# Patient Record
Sex: FEMALE | Race: WHITE | NOT HISPANIC OR LATINO
[De-identification: names, ages, dates, MRNs, and addresses within clinical notes are randomized per-mention and may not be internally consistent; named-entity substitution may affect disease eponyms.]

---

## 2017-11-08 ENCOUNTER — APPOINTMENT (OUTPATIENT)
Dept: INTERNAL MEDICINE | Facility: CLINIC | Age: 62
End: 2017-11-08
Payer: COMMERCIAL

## 2017-11-08 PROCEDURE — 36415 COLL VENOUS BLD VENIPUNCTURE: CPT

## 2017-11-08 PROCEDURE — 93000 ELECTROCARDIOGRAM COMPLETE: CPT

## 2017-11-08 PROCEDURE — 99396 PREV VISIT EST AGE 40-64: CPT | Mod: 25

## 2018-01-24 ENCOUNTER — APPOINTMENT (OUTPATIENT)
Dept: DERMATOLOGY | Facility: CLINIC | Age: 63
End: 2018-01-24
Payer: COMMERCIAL

## 2018-01-24 PROCEDURE — 99213 OFFICE O/P EST LOW 20 MIN: CPT

## 2018-07-23 ENCOUNTER — RX RENEWAL (OUTPATIENT)
Age: 63
End: 2018-07-23

## 2018-07-24 ENCOUNTER — RX RENEWAL (OUTPATIENT)
Age: 63
End: 2018-07-24

## 2018-12-19 ENCOUNTER — NON-APPOINTMENT (OUTPATIENT)
Age: 63
End: 2018-12-19

## 2018-12-19 ENCOUNTER — APPOINTMENT (OUTPATIENT)
Dept: INTERNAL MEDICINE | Facility: CLINIC | Age: 63
End: 2018-12-19
Payer: COMMERCIAL

## 2018-12-19 VITALS
SYSTOLIC BLOOD PRESSURE: 113 MMHG | WEIGHT: 170 LBS | DIASTOLIC BLOOD PRESSURE: 78 MMHG | HEIGHT: 70 IN | BODY MASS INDEX: 24.34 KG/M2

## 2018-12-19 DIAGNOSIS — Z00.00 ENCOUNTER FOR GENERAL ADULT MEDICAL EXAMINATION W/OUT ABNORMAL FINDINGS: ICD-10-CM

## 2018-12-19 PROCEDURE — 36415 COLL VENOUS BLD VENIPUNCTURE: CPT

## 2018-12-19 PROCEDURE — 99396 PREV VISIT EST AGE 40-64: CPT | Mod: 25

## 2018-12-19 PROCEDURE — 93000 ELECTROCARDIOGRAM COMPLETE: CPT

## 2018-12-19 NOTE — HISTORY OF PRESENT ILLNESS
[FreeTextEntry1] : PHYSICAL [de-identified] : 63-year-old female retired  comes in for annual exam overall feeling well

## 2018-12-19 NOTE — PLAN
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

## 2019-01-17 LAB — 25(OH)D3 SERPL-MCNC: 31.8 NG/ML

## 2019-01-25 ENCOUNTER — APPOINTMENT (OUTPATIENT)
Dept: DERMATOLOGY | Facility: CLINIC | Age: 64
End: 2019-01-25
Payer: COMMERCIAL

## 2019-01-25 PROCEDURE — 99214 OFFICE O/P EST MOD 30 MIN: CPT

## 2019-01-31 ENCOUNTER — RX RENEWAL (OUTPATIENT)
Age: 64
End: 2019-01-31

## 2019-04-04 ENCOUNTER — APPOINTMENT (OUTPATIENT)
Dept: INTERNAL MEDICINE | Facility: CLINIC | Age: 64
End: 2019-04-04
Payer: COMMERCIAL

## 2019-04-04 VITALS
BODY MASS INDEX: 24.34 KG/M2 | WEIGHT: 170 LBS | HEIGHT: 70 IN | SYSTOLIC BLOOD PRESSURE: 110 MMHG | DIASTOLIC BLOOD PRESSURE: 60 MMHG

## 2019-04-04 DIAGNOSIS — N63.0 UNSPECIFIED LUMP IN UNSPECIFIED BREAST: ICD-10-CM

## 2019-04-04 PROCEDURE — 99358 PROLONG SERVICE W/O CONTACT: CPT

## 2019-04-04 PROCEDURE — 99214 OFFICE O/P EST MOD 30 MIN: CPT | Mod: 25

## 2019-04-04 NOTE — ASSESSMENT
[Patient Optimized for Surgery] : Patient optimized for surgery [No Further Testing Recommended] : no further testing recommended [As per surgery] : as per surgery [FreeTextEntry4] : Ms. WARREN MARIN is a 64 year female comes to the office for preoperative evaluation for left breast lumpectomy. Patient feels well and has no complaints at this time.  Patient has greater than 4 METS, is a low perioperative risk for Major adverse cardiac event. ECG and blood work reviewed. No further testing indicated at this time. Patient is medically optimized for this procedure.

## 2019-04-04 NOTE — HISTORY OF PRESENT ILLNESS
[No Pertinent Cardiac History] : no history of aortic stenosis, atrial fibrillation, coronary artery disease, recent myocardial infarction, or implantable device/pacemaker [No Pertinent Pulmonary History] : no history of asthma, COPD, sleep apnea, or smoking [No Adverse Anesthesia Reaction] : no adverse anesthesia reaction in self or family member [Chronic Anticoagulation] : no chronic anticoagulation [Chronic Kidney Disease] : no chronic kidney disease [Diabetes] : no diabetes [(Patient denies any chest pain, claudication, dyspnea on exertion, orthopnea, palpitations or syncope)] : Patient denies any chest pain, claudication, dyspnea on exertion, orthopnea, palpitations or syncope [Good (7-10 METs)] : Good (7-10 METs) [FreeTextEntry1] : left breast lumpectomy  [FreeTextEntry2] : 04/08/19 [FreeTextEntry3] : Dr. Dueñas [FreeTextEntry4] : Ms. WARREN MARIN is a 64 year female comes to the office for preoperative evaluation for left breast lumpectomy. Patient feels well and has no complaints at this time.

## 2019-04-04 NOTE — PLAN
[FreeTextEntry1] : Ms. WARREN MARIN is a 64 year female comes to the office for preoperative evaluation for left breast lumpectomy. Patient feels well and has no complaints at this time.  Patient has greater than 4 METS, is a low perioperative risk for Major adverse cardiac event. ECG and blood work reviewed. No further testing indicated at this time. Patient is medically optimized for this procedure. \par \par Prior to patient's arrival, extensive medical records were reviewed including but not limited to, Hospital records records, out patient records, laboratory data and microbiology data \par In addition extensive time was also spent in reviewing diagnostic studies.\par \par The duration of the review took 35 minutes \par \par Counseling included abnormal lab results, differential diagnoses, treatment options, risks and benefits, lifestyle changes, prognosis, current condition, medications, and dose adjustments. \par The patient was interactive, attentive, asked questions, and verbalized understanding

## 2019-04-28 ENCOUNTER — RX RENEWAL (OUTPATIENT)
Age: 64
End: 2019-04-28

## 2019-04-28 DIAGNOSIS — Z91.09 OTHER ALLERGY STATUS, OTHER THAN TO DRUGS AND BIOLOGICAL SUBSTANCES: ICD-10-CM

## 2019-12-13 PROBLEM — Z91.09 ENVIRONMENTAL ALLERGIES: Status: ACTIVE | Noted: 2018-07-24

## 2020-01-20 ENCOUNTER — APPOINTMENT (OUTPATIENT)
Dept: DERMATOLOGY | Facility: CLINIC | Age: 65
End: 2020-01-20
Payer: COMMERCIAL

## 2020-01-20 PROCEDURE — 99213 OFFICE O/P EST LOW 20 MIN: CPT

## 2020-04-19 ENCOUNTER — RX RENEWAL (OUTPATIENT)
Age: 65
End: 2020-04-19

## 2020-07-07 ENCOUNTER — NON-APPOINTMENT (OUTPATIENT)
Age: 65
End: 2020-07-07

## 2020-07-07 ENCOUNTER — APPOINTMENT (OUTPATIENT)
Dept: INTERNAL MEDICINE | Facility: CLINIC | Age: 65
End: 2020-07-07
Payer: COMMERCIAL

## 2020-07-07 VITALS
SYSTOLIC BLOOD PRESSURE: 112 MMHG | HEIGHT: 70 IN | OXYGEN SATURATION: 98 % | DIASTOLIC BLOOD PRESSURE: 72 MMHG | WEIGHT: 172.25 LBS | TEMPERATURE: 98.3 F | HEART RATE: 60 BPM | BODY MASS INDEX: 24.66 KG/M2

## 2020-07-07 VITALS
HEART RATE: 60 BPM | HEIGHT: 70 IN | OXYGEN SATURATION: 98 % | WEIGHT: 172 LBS | TEMPERATURE: 98.3 F | SYSTOLIC BLOOD PRESSURE: 112 MMHG | BODY MASS INDEX: 24.62 KG/M2 | DIASTOLIC BLOOD PRESSURE: 72 MMHG

## 2020-07-07 DIAGNOSIS — Z12.4 ENCOUNTER FOR SCREENING FOR MALIGNANT NEOPLASM OF CERVIX: ICD-10-CM

## 2020-07-07 PROCEDURE — 93000 ELECTROCARDIOGRAM COMPLETE: CPT

## 2020-07-07 PROCEDURE — 99396 PREV VISIT EST AGE 40-64: CPT | Mod: 25

## 2020-07-07 NOTE — HEALTH RISK ASSESSMENT
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

## 2020-07-07 NOTE — HISTORY OF PRESENT ILLNESS
[FreeTextEntry1] : physical [de-identified] : Ms. WARREN MARIN is a 65 year female comes to the office for physical exam. Patient feels well and has no complaints at this time.

## 2020-07-07 NOTE — HEALTH RISK ASSESSMENT
[Good] : ~his/her~  mood as  good [] : No [Yes] : Yes [Monthly or less (1 pt)] : Monthly or less (1 point) [Never (0 pts)] : Never (0 points) [1 or 2 (0 pts)] : 1 or 2 (0 points) [de-identified] : OB/GYN [Patient reported PAP Smear was normal] : Patient reported PAP Smear was normal [Patient reported mammogram was normal] : Patient reported mammogram was normal [Patient reported colonoscopy was normal] : Patient reported colonoscopy was normal [Patient declined bone density test] : Patient declined bone density test [HIV test declined] : HIV test declined [MammogramDate] : 1/2020 [PapSmearDate] : 11/2019 [Hepatitis C test declined] : Hepatitis C test declined [ColonoscopyDate] : 01/27/2016

## 2020-07-07 NOTE — PHYSICAL EXAM
[No Acute Distress] : no acute distress [Well Nourished] : well nourished [Well-Appearing] : well-appearing [Normal Sclera/Conjunctiva] : normal sclera/conjunctiva [Well Developed] : well developed [PERRL] : pupils equal round and reactive to light [Normal Outer Ear/Nose] : the outer ears and nose were normal in appearance [EOMI] : extraocular movements intact [Normal Oropharynx] : the oropharynx was normal [No Lymphadenopathy] : no lymphadenopathy [No JVD] : no jugular venous distention [Supple] : supple [No Respiratory Distress] : no respiratory distress  [Thyroid Normal, No Nodules] : the thyroid was normal and there were no nodules present [No Accessory Muscle Use] : no accessory muscle use [Clear to Auscultation] : lungs were clear to auscultation bilaterally [Normal Rate] : normal rate  [Regular Rhythm] : with a regular rhythm [Normal S1, S2] : normal S1 and S2 [No Murmur] : no murmur heard [No Carotid Bruits] : no carotid bruits [No Abdominal Bruit] : a ~M bruit was not heard ~T in the abdomen [No Varicosities] : no varicosities [Pedal Pulses Present] : the pedal pulses are present [No Edema] : there was no peripheral edema [No Palpable Aorta] : no palpable aorta [No Extremity Clubbing/Cyanosis] : no extremity clubbing/cyanosis [Soft] : abdomen soft [Non Tender] : non-tender [Non-distended] : non-distended [No Masses] : no abdominal mass palpated [Normal Bowel Sounds] : normal bowel sounds [No HSM] : no HSM [Normal Posterior Cervical Nodes] : no posterior cervical lymphadenopathy [Normal Anterior Cervical Nodes] : no anterior cervical lymphadenopathy [No CVA Tenderness] : no CVA  tenderness [No Spinal Tenderness] : no spinal tenderness [No Joint Swelling] : no joint swelling [Grossly Normal Strength/Tone] : grossly normal strength/tone [No Rash] : no rash [No Focal Deficits] : no focal deficits [Coordination Grossly Intact] : coordination grossly intact [Normal Gait] : normal gait [Normal Affect] : the affect was normal [Normal Insight/Judgement] : insight and judgment were intact

## 2020-07-07 NOTE — HEALTH RISK ASSESSMENT
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

## 2020-07-07 NOTE — PHYSICAL EXAM
[No Acute Distress] : no acute distress [Well Nourished] : well nourished [Well Developed] : well developed [PERRL] : pupils equal round and reactive to light [Well-Appearing] : well-appearing [Normal Sclera/Conjunctiva] : normal sclera/conjunctiva [EOMI] : extraocular movements intact [Normal Outer Ear/Nose] : the outer ears and nose were normal in appearance [No JVD] : no jugular venous distention [Normal Oropharynx] : the oropharynx was normal [No Lymphadenopathy] : no lymphadenopathy [Supple] : supple [No Respiratory Distress] : no respiratory distress  [Thyroid Normal, No Nodules] : the thyroid was normal and there were no nodules present [No Accessory Muscle Use] : no accessory muscle use [Clear to Auscultation] : lungs were clear to auscultation bilaterally [Regular Rhythm] : with a regular rhythm [Normal Rate] : normal rate  [Normal S1, S2] : normal S1 and S2 [No Murmur] : no murmur heard [No Carotid Bruits] : no carotid bruits [No Abdominal Bruit] : a ~M bruit was not heard ~T in the abdomen [Pedal Pulses Present] : the pedal pulses are present [No Varicosities] : no varicosities [No Edema] : there was no peripheral edema [No Palpable Aorta] : no palpable aorta [No Extremity Clubbing/Cyanosis] : no extremity clubbing/cyanosis [Soft] : abdomen soft [No Masses] : no abdominal mass palpated [Non-distended] : non-distended [Non Tender] : non-tender [No HSM] : no HSM [Normal Bowel Sounds] : normal bowel sounds [Normal Posterior Cervical Nodes] : no posterior cervical lymphadenopathy [Normal Anterior Cervical Nodes] : no anterior cervical lymphadenopathy [No CVA Tenderness] : no CVA  tenderness [No Joint Swelling] : no joint swelling [No Spinal Tenderness] : no spinal tenderness [Grossly Normal Strength/Tone] : grossly normal strength/tone [No Rash] : no rash [Coordination Grossly Intact] : coordination grossly intact [Normal Affect] : the affect was normal [Normal Gait] : normal gait [No Focal Deficits] : no focal deficits [Normal Insight/Judgement] : insight and judgment were intact

## 2020-07-20 ENCOUNTER — RX RENEWAL (OUTPATIENT)
Age: 65
End: 2020-07-20

## 2020-10-14 ENCOUNTER — RX RENEWAL (OUTPATIENT)
Age: 65
End: 2020-10-14

## 2021-01-20 ENCOUNTER — APPOINTMENT (OUTPATIENT)
Dept: DERMATOLOGY | Facility: CLINIC | Age: 66
End: 2021-01-20
Payer: COMMERCIAL

## 2021-01-20 ENCOUNTER — RESULT REVIEW (OUTPATIENT)
Age: 66
End: 2021-01-20

## 2021-01-20 PROCEDURE — 99213 OFFICE O/P EST LOW 20 MIN: CPT | Mod: 25

## 2021-01-20 PROCEDURE — 11102 TANGNTL BX SKIN SINGLE LES: CPT

## 2021-01-20 PROCEDURE — 99072 ADDL SUPL MATRL&STAF TM PHE: CPT

## 2021-02-03 ENCOUNTER — APPOINTMENT (OUTPATIENT)
Dept: DERMATOLOGY | Facility: CLINIC | Age: 66
End: 2021-02-03
Payer: COMMERCIAL

## 2021-02-03 PROCEDURE — 99072 ADDL SUPL MATRL&STAF TM PHE: CPT

## 2021-02-03 PROCEDURE — 17280 DSTR MAL LS F/E/E/N/L/M .5/<: CPT

## 2021-03-01 ENCOUNTER — TRANSCRIPTION ENCOUNTER (OUTPATIENT)
Age: 66
End: 2021-03-01

## 2021-06-02 ENCOUNTER — APPOINTMENT (OUTPATIENT)
Dept: DERMATOLOGY | Facility: CLINIC | Age: 66
End: 2021-06-02
Payer: COMMERCIAL

## 2021-06-02 PROCEDURE — 99212 OFFICE O/P EST SF 10 MIN: CPT

## 2021-06-02 PROCEDURE — 99072 ADDL SUPL MATRL&STAF TM PHE: CPT

## 2021-09-07 ENCOUNTER — RX RENEWAL (OUTPATIENT)
Age: 66
End: 2021-09-07

## 2021-09-20 ENCOUNTER — APPOINTMENT (OUTPATIENT)
Dept: CARDIOLOGY | Facility: CLINIC | Age: 66
End: 2021-09-20
Payer: COMMERCIAL

## 2021-09-20 VITALS
DIASTOLIC BLOOD PRESSURE: 75 MMHG | WEIGHT: 181 LBS | BODY MASS INDEX: 25.91 KG/M2 | RESPIRATION RATE: 16 BRPM | OXYGEN SATURATION: 98 % | HEIGHT: 70 IN | HEART RATE: 68 BPM | SYSTOLIC BLOOD PRESSURE: 118 MMHG

## 2021-09-20 DIAGNOSIS — Z01.818 ENCOUNTER FOR OTHER PREPROCEDURAL EXAMINATION: ICD-10-CM

## 2021-09-20 DIAGNOSIS — Z78.9 OTHER SPECIFIED HEALTH STATUS: ICD-10-CM

## 2021-09-20 DIAGNOSIS — R73.03 PREDIABETES.: ICD-10-CM

## 2021-09-20 DIAGNOSIS — Z83.3 FAMILY HISTORY OF DIABETES MELLITUS: ICD-10-CM

## 2021-09-20 PROCEDURE — 99204 OFFICE O/P NEW MOD 45 MIN: CPT

## 2021-09-20 PROCEDURE — 93000 ELECTROCARDIOGRAM COMPLETE: CPT

## 2021-09-20 NOTE — HISTORY OF PRESENT ILLNESS
[FreeTextEntry1] : Family hx includes DM type 1 in her son otherwise no premature CAD\par \par Denies a personal hx of HTN, DM, CAD, orthopnea or edema\par \par Does have HLD but not on a statin. Her LDL from 7/2020 = 155 ut this was at the peak of her weight gain. \par She was instructed to diet and exercise but she admits she has not been as strict  with it. \par \par Denies chest pain, exertional shortness of breath, PND, orthopnea or edema.\par \par Exercises regularly and without difficulty.\par \par Denies hypertension, diabetes, tobacco use 5

## 2021-09-20 NOTE — REASON FOR VISIT
[FreeTextEntry1] : 66 year old post menopausal female presenting for cardiac evaluation.\par Her last work up was performed in 2008 .\par \par Lapse in care and 2 episodes of palpitations prompted this visit. \par \par Over the last year she has had x 2 episodes of long lasting palpitations ( several hours). Describes sensation as irregular. Both seem to present shortly after having 2 cocktails. There are not associated symptoms. \par \par She exercises regularly with out any exertional discomfort. \par \par Admits that she is up 12 lbs during the pandemic. \par \par

## 2021-09-20 NOTE — ASSESSMENT
[FreeTextEntry1] : ECG- SR 68 BPM and WNL. \par \par Laboratory data 7/7/2020:\par Cholesterol 234\par HDL 56\par \par Triglycerides 113\par \par Impression\par 66 year old female presenting for cardiac w/u with concerns of  x 2 episodes of long lasting palpitations provoked by ETOH consistent with possible PAF. Symptom does not limit her. \par \par 1. ECG today unremarkable\par 2. No known CRFs\par 3.  Lipid panel is not available to review. \par 4. No coronary symptoms or signs of HF one exam.\par 5. Last echo from 2008 with NL EF and no sig. abnormalities.  \par \par Plan\par 1. Repeat BW to recheck Lipids\par    If no improvement this will need to be addressed pharmacologically. \par \par 2. 24 hr holter to r/o PAF or other arrhythmia\par \par 3. Avoid ETOH\par \par 4. Continue exercising and watch diet. \par \par 5. Schedule an echo to reassess cardiac structure and fxn. \par \par Clinical follow up after testing

## 2021-10-01 ENCOUNTER — APPOINTMENT (OUTPATIENT)
Dept: CARDIOLOGY | Facility: CLINIC | Age: 66
End: 2021-10-01
Payer: COMMERCIAL

## 2021-10-01 PROCEDURE — 93306 TTE W/DOPPLER COMPLETE: CPT

## 2021-11-09 ENCOUNTER — APPOINTMENT (OUTPATIENT)
Dept: INTERNAL MEDICINE | Facility: CLINIC | Age: 66
End: 2021-11-09
Payer: COMMERCIAL

## 2021-11-09 VITALS
SYSTOLIC BLOOD PRESSURE: 114 MMHG | WEIGHT: 176.38 LBS | DIASTOLIC BLOOD PRESSURE: 84 MMHG | BODY MASS INDEX: 25.25 KG/M2 | HEIGHT: 70 IN | TEMPERATURE: 97.1 F | HEART RATE: 72 BPM

## 2021-11-09 DIAGNOSIS — R73.9 HYPERGLYCEMIA, UNSPECIFIED: ICD-10-CM

## 2021-11-09 DIAGNOSIS — Z91.030 BEE ALLERGY STATUS: ICD-10-CM

## 2021-11-09 PROCEDURE — 99396 PREV VISIT EST AGE 40-64: CPT | Mod: 25

## 2021-11-09 PROCEDURE — 36415 COLL VENOUS BLD VENIPUNCTURE: CPT

## 2021-11-09 NOTE — HEALTH RISK ASSESSMENT
[Good] : ~his/her~  mood as  good [] : No [Yes] : Yes [Monthly or less (1 pt)] : Monthly or less (1 point) [1 or 2 (0 pts)] : 1 or 2 (0 points) [Never (0 pts)] : Never (0 points) [0] : 2) Feeling down, depressed, or hopeless: Not at all (0) [PHQ-2 Negative - No further assessment needed] : PHQ-2 Negative - No further assessment needed [de-identified] : OB/GYN [[id number]] : 0 [Patient reported mammogram was normal] : Patient reported mammogram was normal [Patient reported PAP Smear was normal] : Patient reported PAP Smear was normal [Patient declined bone density test] : Patient declined bone density test [Patient reported colonoscopy was normal] : Patient reported colonoscopy was normal [HIV test declined] : HIV test declined [Hepatitis C test declined] : Hepatitis C test declined [MammogramDate] : 1/2021 [PapSmearDate] : 11/2019 [ColonoscopyDate] : 10/16

## 2021-11-09 NOTE — HISTORY OF PRESENT ILLNESS
[FreeTextEntry1] : physical [de-identified] : Ms. WARREN MARIN is a 66 year female comes to the office for physical exam. Patient feels well and has no complaints at this time.

## 2021-11-09 NOTE — PHYSICAL EXAM
[Well Nourished] : well nourished [Well Developed] : well developed [Well-Appearing] : well-appearing [Normal Sclera/Conjunctiva] : normal sclera/conjunctiva [PERRL] : pupils equal round and reactive to light [EOMI] : extraocular movements intact [Normal Outer Ear/Nose] : the outer ears and nose were normal in appearance [Normal Oropharynx] : the oropharynx was normal [No JVD] : no jugular venous distention [No Lymphadenopathy] : no lymphadenopathy [Supple] : supple [Thyroid Normal, No Nodules] : the thyroid was normal and there were no nodules present [No Respiratory Distress] : no respiratory distress  [No Accessory Muscle Use] : no accessory muscle use [Clear to Auscultation] : lungs were clear to auscultation bilaterally [Normal Rate] : normal rate  [Regular Rhythm] : with a regular rhythm [Normal S1, S2] : normal S1 and S2 [No Carotid Bruits] : no carotid bruits [No Murmur] : no murmur heard [No Abdominal Bruit] : a ~M bruit was not heard ~T in the abdomen [No Varicosities] : no varicosities [Pedal Pulses Present] : the pedal pulses are present [No Edema] : there was no peripheral edema [No Palpable Aorta] : no palpable aorta [No Extremity Clubbing/Cyanosis] : no extremity clubbing/cyanosis [Soft] : abdomen soft [Non Tender] : non-tender [Non-distended] : non-distended [No Masses] : no abdominal mass palpated [No HSM] : no HSM [Normal Bowel Sounds] : normal bowel sounds [Normal Posterior Cervical Nodes] : no posterior cervical lymphadenopathy [Normal Anterior Cervical Nodes] : no anterior cervical lymphadenopathy [No CVA Tenderness] : no CVA  tenderness [No Spinal Tenderness] : no spinal tenderness [No Joint Swelling] : no joint swelling [No Rash] : no rash [Grossly Normal Strength/Tone] : grossly normal strength/tone [Coordination Grossly Intact] : coordination grossly intact [No Focal Deficits] : no focal deficits [Normal Gait] : normal gait [Normal Affect] : the affect was normal [Normal Insight/Judgement] : insight and judgment were intact

## 2021-11-09 NOTE — PLAN
[FreeTextEntry1] : In regards to patients Physical exam, routine blood work drawn, will review results with patient. \par \par Counseling included abnormal lab results, differential diagnoses, treatment options, risks and benefits, lifestyle changes, current condition, medications, and dose adjustments. \par The patient was interactive, attentive, asked questions, and verbalized understanding

## 2021-11-10 LAB
25(OH)D3 SERPL-MCNC: 28.9 NG/ML
ALBUMIN SERPL ELPH-MCNC: 4.8 G/DL
ALP BLD-CCNC: 88 U/L
ALT SERPL-CCNC: 30 U/L
ANION GAP SERPL CALC-SCNC: 14 MMOL/L
AST SERPL-CCNC: 25 U/L
BASOPHILS # BLD AUTO: 0.06 K/UL
BASOPHILS NFR BLD AUTO: 1.2 %
BILIRUB SERPL-MCNC: 0.4 MG/DL
BUN SERPL-MCNC: 13 MG/DL
CALCIUM SERPL-MCNC: 10.1 MG/DL
CHLORIDE SERPL-SCNC: 98 MMOL/L
CHOLEST SERPL-MCNC: 217 MG/DL
CO2 SERPL-SCNC: 24 MMOL/L
CREAT SERPL-MCNC: 0.69 MG/DL
EOSINOPHIL # BLD AUTO: 0.09 K/UL
EOSINOPHIL NFR BLD AUTO: 1.8 %
ESTIMATED AVERAGE GLUCOSE: 114 MG/DL
GLUCOSE SERPL-MCNC: 85 MG/DL
HBA1C MFR BLD HPLC: 5.6 %
HCT VFR BLD CALC: 39.8 %
HDLC SERPL-MCNC: 57 MG/DL
HGB BLD-MCNC: 13.1 G/DL
IMM GRANULOCYTES NFR BLD AUTO: 0.2 %
LDLC SERPL CALC-MCNC: 145 MG/DL
LYMPHOCYTES # BLD AUTO: 1.8 K/UL
LYMPHOCYTES NFR BLD AUTO: 35.5 %
MAN DIFF?: NORMAL
MCHC RBC-ENTMCNC: 30.1 PG
MCHC RBC-ENTMCNC: 32.9 GM/DL
MCV RBC AUTO: 91.5 FL
MONOCYTES # BLD AUTO: 0.36 K/UL
MONOCYTES NFR BLD AUTO: 7.1 %
NEUTROPHILS # BLD AUTO: 2.75 K/UL
NEUTROPHILS NFR BLD AUTO: 54.2 %
NONHDLC SERPL-MCNC: 160 MG/DL
PLATELET # BLD AUTO: 349 K/UL
POTASSIUM SERPL-SCNC: 4.5 MMOL/L
PROT SERPL-MCNC: 7 G/DL
RBC # BLD: 4.35 M/UL
RBC # FLD: 12.9 %
SODIUM SERPL-SCNC: 136 MMOL/L
TRIGL SERPL-MCNC: 79 MG/DL
TSH SERPL-ACNC: 2.24 UIU/ML
WBC # FLD AUTO: 5.07 K/UL

## 2021-11-16 ENCOUNTER — APPOINTMENT (OUTPATIENT)
Dept: CARDIOLOGY | Facility: CLINIC | Age: 66
End: 2021-11-16
Payer: COMMERCIAL

## 2021-11-16 VITALS
RESPIRATION RATE: 16 BRPM | OXYGEN SATURATION: 98 % | DIASTOLIC BLOOD PRESSURE: 70 MMHG | WEIGHT: 176 LBS | BODY MASS INDEX: 25.2 KG/M2 | SYSTOLIC BLOOD PRESSURE: 110 MMHG | HEART RATE: 85 BPM | HEIGHT: 70 IN

## 2021-11-16 DIAGNOSIS — R00.2 PALPITATIONS: ICD-10-CM

## 2021-11-16 PROCEDURE — 93000 ELECTROCARDIOGRAM COMPLETE: CPT

## 2021-11-16 PROCEDURE — 99214 OFFICE O/P EST MOD 30 MIN: CPT

## 2021-11-16 NOTE — HISTORY OF PRESENT ILLNESS
[FreeTextEntry1] : Family hx includes DM type 1 - her son otherwise no premature CAD\par \par Denies a personal hx of HTN, DM, CAD, orthopnea or edema\par \par Does have HLD but not on a statin. \par  LDL  7/2020 = 155 - at the peak of her weight gain. \par She was instructed to diet and exercise but she admits she has not been as strict  with it. \par \par Denies chest pain, exertional shortness of breath, PND, orthopnea or edema.\par \par Exercises regularly and without difficulty.\par \par Denies hypertension, diabetes, tobacco use

## 2021-11-16 NOTE — ASSESSMENT
[FreeTextEntry1] : ECG- SR 68 BPM and WNL. \par \par Laboratory data \par -------7/7/20----11/10/21\par Chol---234-------217\par HDL----56---------57\par LDL---155-------145\par Trigl---113--------79\par \par Echocardiogram 10/1/2021:\par Normal LV size and function with ejection fraction 55 to 60%\par Mitral valve thickening with trace MR\par Trace PI TR.\par \par 24-hour monitor 9/20/2021:\par Average heart rate equaled 72\par Range 50-1 13\par Rare PVCs and APCs.\par No evidence of A. fib\par \par Impression\par 66 year old female presenting for cardiac w/u with concerns of  x 2 episodes of long lasting palpitations provoked by ETOH consistent with possible PAF. Symptom does not limit her. \par \par 1.  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  ECG today unremarkable\par 2.  Evidence of hyperlipidemia\par 3.  Structurally normal heart with no evidence of any enlargement or valvulopathy\par 4.  Unremarkable Holter\par \par \par Plan\par 1.  Patient will argues for continues efforts with diet and exercise " hadn't really tried yet'\par \par 2.  We will repeat blood work end of March\par \par 3. Avoid ETOH\par \par 4. Continue exercising and watch diet. \par \par

## 2021-12-29 ENCOUNTER — APPOINTMENT (OUTPATIENT)
Dept: FAMILY MEDICINE | Facility: CLINIC | Age: 66
End: 2021-12-29
Payer: COMMERCIAL

## 2021-12-29 DIAGNOSIS — J06.9 ACUTE UPPER RESPIRATORY INFECTION, UNSPECIFIED: ICD-10-CM

## 2021-12-29 DIAGNOSIS — H92.01 OTALGIA, RIGHT EAR: ICD-10-CM

## 2021-12-29 PROCEDURE — 99442: CPT

## 2021-12-29 NOTE — ASSESSMENT
[FreeTextEntry1] : \par In regards Upper respiratory infection: \par likely viral but I am concerned for superimposed bacterial sinusitis/ear infection\par Sending Augmentin, to take with probiotics\par Continue Clarinex as needed\par Patient denies symptoms of respiratory distress at this time. Patient is speaking in full sentences\par I advised the patient to take ibuprofen/acetaminophen for pain and or fever, to increase the oral intake to 2-3 L/day unless the patient has a contraindication to do so. Also to monitor for worsening symptoms including but not limited to uncontrolled fever, shortness of breath, worsening sore throat, weakness, being unable to tolerate the oral intake and to go to the Emergency department if the symptoms worsen. \par Advised to social distance, wear a mask and continue to follow up CDC guidelines for infectious diseases including but not limited to COVID-19\par \par Return to care: as needed\par Call or return for any questions

## 2022-01-26 ENCOUNTER — APPOINTMENT (OUTPATIENT)
Dept: DERMATOLOGY | Facility: CLINIC | Age: 67
End: 2022-01-26
Payer: COMMERCIAL

## 2022-01-26 PROCEDURE — 99213 OFFICE O/P EST LOW 20 MIN: CPT

## 2022-05-24 ENCOUNTER — RESULT REVIEW (OUTPATIENT)
Age: 67
End: 2022-05-24

## 2022-05-25 ENCOUNTER — APPOINTMENT (OUTPATIENT)
Dept: DERMATOLOGY | Facility: CLINIC | Age: 67
End: 2022-05-25
Payer: MEDICARE

## 2022-05-25 PROCEDURE — 99212 OFFICE O/P EST SF 10 MIN: CPT | Mod: 25

## 2022-05-25 PROCEDURE — 11102 TANGNTL BX SKIN SINGLE LES: CPT

## 2022-07-14 ENCOUNTER — NON-APPOINTMENT (OUTPATIENT)
Age: 67
End: 2022-07-14

## 2022-07-14 ENCOUNTER — APPOINTMENT (OUTPATIENT)
Dept: DERMATOLOGY | Facility: CLINIC | Age: 67
End: 2022-07-14

## 2022-07-14 PROCEDURE — 14061 TIS TRNFR E/N/E/L10.1-30SQCM: CPT

## 2022-07-14 PROCEDURE — 17311 MOHS 1 STAGE H/N/HF/G: CPT

## 2022-07-14 PROCEDURE — 17312 MOHS ADDL STAGE: CPT

## 2022-07-21 ENCOUNTER — APPOINTMENT (OUTPATIENT)
Dept: DERMATOLOGY | Facility: CLINIC | Age: 67
End: 2022-07-21

## 2022-07-21 PROCEDURE — 99024 POSTOP FOLLOW-UP VISIT: CPT

## 2022-08-11 ENCOUNTER — APPOINTMENT (OUTPATIENT)
Dept: DERMATOLOGY | Facility: CLINIC | Age: 67
End: 2022-08-11

## 2022-08-11 PROCEDURE — 99024 POSTOP FOLLOW-UP VISIT: CPT

## 2022-10-20 ENCOUNTER — APPOINTMENT (OUTPATIENT)
Dept: DERMATOLOGY | Facility: CLINIC | Age: 67
End: 2022-10-20

## 2022-10-20 DIAGNOSIS — C44.01 BASAL CELL CARCINOMA OF SKIN OF LIP: ICD-10-CM

## 2022-10-20 PROCEDURE — 99212 OFFICE O/P EST SF 10 MIN: CPT

## 2023-01-26 ENCOUNTER — APPOINTMENT (OUTPATIENT)
Dept: DERMATOLOGY | Facility: CLINIC | Age: 68
End: 2023-01-26
Payer: MEDICARE

## 2023-01-26 PROCEDURE — 99213 OFFICE O/P EST LOW 20 MIN: CPT

## 2023-06-26 NOTE — HISTORY OF PRESENT ILLNESS
Light Source: [facility] [de-identified] : Ms. WARREN MARIN is a 65 year female with a PMH of  [FreeTextEntry1] : physical

## 2023-07-27 ENCOUNTER — APPOINTMENT (OUTPATIENT)
Dept: DERMATOLOGY | Facility: CLINIC | Age: 68
End: 2023-07-27
Payer: MEDICARE

## 2023-07-27 PROCEDURE — 99213 OFFICE O/P EST LOW 20 MIN: CPT

## 2023-09-29 ENCOUNTER — RX RENEWAL (OUTPATIENT)
Age: 68
End: 2023-09-29

## 2023-10-18 RX ORDER — DESLORATADINE 5 MG/1
5 TABLET, FILM COATED ORAL
Qty: 90 | Refills: 1 | Status: ACTIVE | COMMUNITY
Start: 2019-01-31 | End: 1900-01-01

## 2023-10-24 ENCOUNTER — APPOINTMENT (OUTPATIENT)
Dept: INTERNAL MEDICINE | Facility: CLINIC | Age: 68
End: 2023-10-24
Payer: MEDICARE

## 2023-10-24 VITALS
BODY MASS INDEX: 24.62 KG/M2 | WEIGHT: 172 LBS | DIASTOLIC BLOOD PRESSURE: 88 MMHG | SYSTOLIC BLOOD PRESSURE: 126 MMHG | HEART RATE: 71 BPM | HEIGHT: 70 IN

## 2023-10-24 DIAGNOSIS — E55.9 VITAMIN D DEFICIENCY, UNSPECIFIED: ICD-10-CM

## 2023-10-24 DIAGNOSIS — R53.83 OTHER FATIGUE: ICD-10-CM

## 2023-10-24 DIAGNOSIS — E03.8 OTHER SPECIFIED HYPOTHYROIDISM: ICD-10-CM

## 2023-10-24 DIAGNOSIS — R79.9 ABNORMAL FINDING OF BLOOD CHEMISTRY, UNSPECIFIED: ICD-10-CM

## 2023-10-24 DIAGNOSIS — Z00.00 ENCOUNTER FOR GENERAL ADULT MEDICAL EXAMINATION W/OUT ABNORMAL FINDINGS: ICD-10-CM

## 2023-10-24 DIAGNOSIS — E78.5 HYPERLIPIDEMIA, UNSPECIFIED: ICD-10-CM

## 2023-10-24 PROCEDURE — 36415 COLL VENOUS BLD VENIPUNCTURE: CPT

## 2023-10-24 PROCEDURE — G0438: CPT

## 2023-10-24 RX ORDER — AMOXICILLIN AND CLAVULANATE POTASSIUM 875; 125 MG/1; MG/1
875-125 TABLET, COATED ORAL
Qty: 14 | Refills: 0 | Status: DISCONTINUED | COMMUNITY
Start: 2021-12-29 | End: 2023-10-24

## 2023-10-24 RX ORDER — METOPROLOL TARTRATE 50 MG/1
50 TABLET, FILM COATED ORAL
Qty: 30 | Refills: 0 | Status: COMPLETED | COMMUNITY
Start: 2021-11-16 | End: 2023-10-24

## 2023-10-25 LAB
25(OH)D3 SERPL-MCNC: 36.7 NG/ML
ALBUMIN SERPL ELPH-MCNC: 4.8 G/DL
ALP BLD-CCNC: 96 U/L
ALT SERPL-CCNC: 27 U/L
ANION GAP SERPL CALC-SCNC: 14 MMOL/L
AST SERPL-CCNC: 21 U/L
BASOPHILS # BLD AUTO: 0.06 K/UL
BASOPHILS NFR BLD AUTO: 1.2 %
BILIRUB SERPL-MCNC: 0.4 MG/DL
BUN SERPL-MCNC: 15 MG/DL
CALCIUM SERPL-MCNC: 10.5 MG/DL
CHLORIDE SERPL-SCNC: 97 MMOL/L
CHOLEST SERPL-MCNC: 245 MG/DL
CO2 SERPL-SCNC: 25 MMOL/L
CREAT SERPL-MCNC: 0.72 MG/DL
EGFR: 92 ML/MIN/1.73M2
EOSINOPHIL # BLD AUTO: 0.13 K/UL
EOSINOPHIL NFR BLD AUTO: 2.6 %
ESTIMATED AVERAGE GLUCOSE: 120 MG/DL
GLUCOSE SERPL-MCNC: 94 MG/DL
HBA1C MFR BLD HPLC: 5.8 %
HCT VFR BLD CALC: 43 %
HDLC SERPL-MCNC: 64 MG/DL
HGB BLD-MCNC: 13.9 G/DL
IMM GRANULOCYTES NFR BLD AUTO: 0.2 %
LDLC SERPL CALC-MCNC: 162 MG/DL
LYMPHOCYTES # BLD AUTO: 1.82 K/UL
LYMPHOCYTES NFR BLD AUTO: 36.5 %
MAGNESIUM SERPL-MCNC: 2.2 MG/DL
MAN DIFF?: NORMAL
MCHC RBC-ENTMCNC: 29.8 PG
MCHC RBC-ENTMCNC: 32.3 GM/DL
MCV RBC AUTO: 92.1 FL
MONOCYTES # BLD AUTO: 0.3 K/UL
MONOCYTES NFR BLD AUTO: 6 %
NEUTROPHILS # BLD AUTO: 2.67 K/UL
NEUTROPHILS NFR BLD AUTO: 53.5 %
NONHDLC SERPL-MCNC: 181 MG/DL
PLATELET # BLD AUTO: 361 K/UL
POTASSIUM SERPL-SCNC: 4.9 MMOL/L
PROT SERPL-MCNC: 7.5 G/DL
RBC # BLD: 4.67 M/UL
RBC # FLD: 13.6 %
SODIUM SERPL-SCNC: 135 MMOL/L
TRIGL SERPL-MCNC: 109 MG/DL
TSH SERPL-ACNC: 2.22 UIU/ML
WBC # FLD AUTO: 4.99 K/UL

## 2023-10-27 ENCOUNTER — RX RENEWAL (OUTPATIENT)
Age: 68
End: 2023-10-27

## 2023-10-27 RX ORDER — FLUTICASONE PROPIONATE 50 UG/1
50 SPRAY, METERED NASAL TWICE DAILY
Qty: 32 | Refills: 1 | Status: ACTIVE | COMMUNITY
Start: 2019-12-13 | End: 1900-01-01

## 2023-10-27 RX ORDER — EPINEPHRINE 0.3 MG/.3ML
0.3 INJECTION INTRAMUSCULAR
Qty: 2 | Refills: 0 | Status: ACTIVE | COMMUNITY
Start: 1900-01-01 | End: 1900-01-01

## 2024-02-20 NOTE — HISTORY OF PRESENT ILLNESS
Refill Routing Note   Medication(s) are not appropriate for processing by [facility] for the following reason(s):        New or recently adjusted medication    [facility] action(s):  Defer             Appointments  past 12m or future 3m with PCP    Date Provider   Last Visit   1/26/2024 Valdemar Mustafa MD   Next Visit   Visit date not found Valdemar Mustafa MD   ED visits in past 90 days: 0        Note composed:12:06 PM 02/20/2024                           [Home] : at home, [unfilled] , at the time of the visit. [Medical Office: ([facility])___] : at the medical office located in  [Verbal consent obtained from patient] : the patient, [unfilled] [FreeTextEntry8] : Ms. WARREN MARIN is a pleasant 66 year old female with no significant PMH who normally follows up with Dr Funez. Patient is being seen via telephone because of allergies like symptoms. She started with sinus pain and an an infection around dec/21/21. She tried OTC allergy medications, Mucinex which did not really help her. She now has right ear pain, cough with some phlegm and postnasal drip.\par  Patient denies fever, rhinorrhea, shortness of breath, sick contacts, GI symptoms. Patient is tolerating the oral diet.\par \par Patient is COVID-19 vaccinated

## 2024-02-26 ENCOUNTER — APPOINTMENT (OUTPATIENT)
Dept: DERMATOLOGY | Facility: CLINIC | Age: 69
End: 2024-02-26
Payer: MEDICARE

## 2024-02-26 PROCEDURE — 99213 OFFICE O/P EST LOW 20 MIN: CPT

## 2024-03-07 ENCOUNTER — APPOINTMENT (OUTPATIENT)
Dept: ELECTROPHYSIOLOGY | Facility: CLINIC | Age: 69
End: 2024-03-07
Payer: MEDICARE

## 2024-03-07 VITALS
WEIGHT: 170 LBS | BODY MASS INDEX: 24.34 KG/M2 | SYSTOLIC BLOOD PRESSURE: 128 MMHG | OXYGEN SATURATION: 98 % | HEIGHT: 70 IN | HEART RATE: 79 BPM | DIASTOLIC BLOOD PRESSURE: 78 MMHG

## 2024-03-07 DIAGNOSIS — I48.0 PAROXYSMAL ATRIAL FIBRILLATION: ICD-10-CM

## 2024-03-07 PROCEDURE — 93000 ELECTROCARDIOGRAM COMPLETE: CPT

## 2024-03-07 PROCEDURE — 99205 OFFICE O/P NEW HI 60 MIN: CPT

## 2024-03-07 RX ORDER — APIXABAN 5 MG/1
5 TABLET, FILM COATED ORAL
Qty: 60 | Refills: 3 | Status: ACTIVE | COMMUNITY
Start: 2024-03-07 | End: 1900-01-01

## 2024-03-07 NOTE — PHYSICAL EXAM
[Well Nourished] : well nourished [Well Developed] : well developed [No Acute Distress] : no acute distress [Normal Conjunctiva] : normal conjunctiva [Normal Venous Pressure] : normal venous pressure [Normal S1, S2] : normal S1, S2 [No Murmur] : no murmur [No Rub] : no rub [Clear Lung Fields] : clear lung fields [Good Air Entry] : good air entry [No Respiratory Distress] : no respiratory distress  [Normal Gait] : normal gait [Soft] : abdomen soft [No Edema] : no edema [No Cyanosis] : no cyanosis [No Clubbing] : no clubbing [No Rash] : no rash [Moves all extremities] : moves all extremities [No Focal Deficits] : no focal deficits [Alert and Oriented] : alert and oriented

## 2024-03-07 NOTE — HISTORY OF PRESENT ILLNESS
[FreeTextEntry1] : 69 year old woman presenting for evaluation of paroxysmal atrial fibrillation.   She has had episodes of palpitation for the last few years. During evaluation with Dr Cifuentes in 2021 she noted two episodes of irregular palpitation lasting several hours. Both occurred after having two cocktails. Lifestyle modifications and risk reduction were advised at that time. She was also treated with metoprolol 50mg qd, but preferred to avoid medications.  Subsequently she has continued to have episodes of palpitation that she notes usually when getting up in the middle of the night to go to the bathroom. The symptoms usually resolve after she goes back to sleep. She has not had recent daytime symptoms.  An event monitor performed by Dr Pleitez from 11/27 to 12/13/23 revealed episodes of paroxysmal AF lasting several hours, with rapid rates (120s-170s bpm), and overall AF burden 26%.  She has not started any medications for AF, and is hopeful to avoid additional meds due to fear of side effects.  Cardiac workup with stress testing and TTE have been unremarkable.  She denies a history of any HTN, blood clots, DM, or other cardiac disease.  She has not had evaluation for sleep study in the past.  She drinks occasional alcoholic beverage, about once weekly.  She does walk regularly and denies exertional limitations/symptoms. she exercises on a bike during the summer.

## 2024-03-07 NOTE — REASON FOR VISIT
[Arrhythmia/ECG Abnorrmalities] : arrhythmia/ECG abnormalities [FreeTextEntry1] : incomplete note [FreeTextEntry3] : Dr Shailesh Pleitez

## 2024-03-07 NOTE — CARDIOLOGY SUMMARY
[de-identified] : 3/7/24 sinus rhythm 75 bpm, narrow QRS, rsr' [de-identified] : stress 10/19/23 normal perfusion imaging [de-identified] : event monitor performed by Dr Pleitez from 11/27 to 12/13/23 revealed episodes of paroxysmal AF lasting several hours, with rapid rates (120s-170s bpm), and overall AF burden 26%.   24-hour monitor 9/20/2021: Average heart rate equaled 72  Range 50-1 13  Rare PVCs and APCs.  No evidence of A. fib [de-identified] : TTE 10/23/23 LVEF 55-60%, LA 3.5cm, mild MR Echocardiogram 10/1/2021: Normal LV size and function with ejection fraction 55 to 60%  Mitral valve thickening with trace MR  Trace PI TR.

## 2024-03-07 NOTE — DISCUSSION/SUMMARY
[FreeTextEntry1] : 69 year old woman presenting for evaluation of paroxysmal atrial fibrillation.  She has had episodes of palpitation, which have generally been noted during nighttime and have been mild and infrequent. However, monitoring has revealed a high burden of pAF with rapid rates.  We discussed AF in detail, associated risks and morbidities, and management options.  She has a CHADSVASc=2, and given relatively high AF burden noted on monitor, I did advise she start anticoagulation. The risks and benefits of anticoagulation were discussed in detail, including risks of bleeding. She expressed understanding and is willing to start anticoagulation at this time.  We also reviewed options including conservative monitoring, medical therapy/antiarrhythmic meds, and catheter ablation. She prefers to avoid additional medications as much as possible. She does want to consider ablation, particularly if her AF burden remains high/progresses, or if symptoms become more frequent. For now will repeat monitoring, and reconsider AF ablation on followup. The risks and benefits of ablation were discussed, and she has been familiar with this as her  also has AF and was recommended to have an ablation.  The importance of risk factor control was also discussed, including reduction in etoh intake, and evaluation for sleep apnea. Given her nighttime symptoms, a sleep study was recommended.  -MCOT x 1 week -start Eliquis 5mg bid -sleep study -EP followup in 3 mo or prn [EKG obtained to assist in diagnosis and management of assessed problem(s)] : EKG obtained to assist in diagnosis and management of assessed problem(s)

## 2024-03-18 ENCOUNTER — NON-APPOINTMENT (OUTPATIENT)
Age: 69
End: 2024-03-18

## 2024-03-22 ENCOUNTER — OUTPATIENT (OUTPATIENT)
Dept: OUTPATIENT SERVICES | Facility: HOSPITAL | Age: 69
LOS: 1 days | End: 2024-03-22
Payer: MEDICARE

## 2024-03-22 DIAGNOSIS — G47.33 OBSTRUCTIVE SLEEP APNEA (ADULT) (PEDIATRIC): ICD-10-CM

## 2024-03-22 PROCEDURE — G0400: CPT | Mod: 26

## 2024-03-22 PROCEDURE — 95800 SLP STDY UNATTENDED: CPT

## 2024-06-20 ENCOUNTER — APPOINTMENT (OUTPATIENT)
Dept: ELECTROPHYSIOLOGY | Facility: CLINIC | Age: 69
End: 2024-06-20

## 2024-08-12 ENCOUNTER — RX RENEWAL (OUTPATIENT)
Age: 69
End: 2024-08-12

## 2024-08-12 RX ORDER — DESLORATADINE 5 MG/1
5 TABLET ORAL
Qty: 90 | Refills: 1 | Status: ACTIVE | COMMUNITY
Start: 2024-08-12 | End: 1900-01-01

## 2024-11-25 ENCOUNTER — APPOINTMENT (OUTPATIENT)
Dept: DERMATOLOGY | Facility: CLINIC | Age: 69
End: 2024-11-25
Payer: MEDICARE

## 2024-11-25 PROCEDURE — 99213 OFFICE O/P EST LOW 20 MIN: CPT

## 2025-01-28 ENCOUNTER — APPOINTMENT (OUTPATIENT)
Dept: INTERNAL MEDICINE | Facility: CLINIC | Age: 70
End: 2025-01-28
Payer: MEDICARE

## 2025-01-28 VITALS
SYSTOLIC BLOOD PRESSURE: 132 MMHG | WEIGHT: 168 LBS | HEIGHT: 70 IN | HEART RATE: 84 BPM | BODY MASS INDEX: 24.05 KG/M2 | DIASTOLIC BLOOD PRESSURE: 83 MMHG

## 2025-01-28 DIAGNOSIS — Z00.00 ENCOUNTER FOR GENERAL ADULT MEDICAL EXAMINATION W/OUT ABNORMAL FINDINGS: ICD-10-CM

## 2025-01-28 DIAGNOSIS — R53.83 OTHER FATIGUE: ICD-10-CM

## 2025-01-28 DIAGNOSIS — R79.9 ABNORMAL FINDING OF BLOOD CHEMISTRY, UNSPECIFIED: ICD-10-CM

## 2025-01-28 DIAGNOSIS — I48.0 PAROXYSMAL ATRIAL FIBRILLATION: ICD-10-CM

## 2025-01-28 PROCEDURE — G0439: CPT

## 2025-01-31 DIAGNOSIS — E78.5 HYPERLIPIDEMIA, UNSPECIFIED: ICD-10-CM

## 2025-01-31 RX ORDER — ROSUVASTATIN CALCIUM 10 MG/1
10 TABLET, FILM COATED ORAL
Qty: 90 | Refills: 3 | Status: ACTIVE | COMMUNITY
Start: 2025-01-31 | End: 1900-01-01
